# Patient Record
Sex: FEMALE | Race: OTHER | Employment: UNEMPLOYED | URBAN - METROPOLITAN AREA
[De-identification: names, ages, dates, MRNs, and addresses within clinical notes are randomized per-mention and may not be internally consistent; named-entity substitution may affect disease eponyms.]

---

## 2021-04-08 ENCOUNTER — OFFICE VISIT (OUTPATIENT)
Dept: FAMILY MEDICINE CLINIC | Facility: CLINIC | Age: 31
End: 2021-04-08
Payer: MEDICAID

## 2021-04-08 ENCOUNTER — NURSE TRIAGE (OUTPATIENT)
Dept: FAMILY MEDICINE CLINIC | Facility: CLINIC | Age: 31
End: 2021-04-08

## 2021-04-08 VITALS
HEIGHT: 65 IN | BODY MASS INDEX: 24.83 KG/M2 | SYSTOLIC BLOOD PRESSURE: 108 MMHG | HEART RATE: 81 BPM | WEIGHT: 149 LBS | DIASTOLIC BLOOD PRESSURE: 73 MMHG

## 2021-04-08 DIAGNOSIS — R11.0 NAUSEA: ICD-10-CM

## 2021-04-08 DIAGNOSIS — R42 VERTIGO: Primary | ICD-10-CM

## 2021-04-08 DIAGNOSIS — B35.1 TOENAIL FUNGUS: ICD-10-CM

## 2021-04-08 PROCEDURE — 3008F BODY MASS INDEX DOCD: CPT | Performed by: NURSE PRACTITIONER

## 2021-04-08 PROCEDURE — 81025 URINE PREGNANCY TEST: CPT | Performed by: NURSE PRACTITIONER

## 2021-04-08 PROCEDURE — 99203 OFFICE O/P NEW LOW 30 MIN: CPT | Performed by: NURSE PRACTITIONER

## 2021-04-08 PROCEDURE — 3074F SYST BP LT 130 MM HG: CPT | Performed by: NURSE PRACTITIONER

## 2021-04-08 PROCEDURE — 3078F DIAST BP <80 MM HG: CPT | Performed by: NURSE PRACTITIONER

## 2021-04-08 RX ORDER — MECLIZINE HCL 12.5 MG/1
12.5 TABLET ORAL 3 TIMES DAILY PRN
Qty: 30 TABLET | Refills: 0 | Status: SHIPPED | OUTPATIENT
Start: 2021-04-08 | End: 2021-04-27

## 2021-04-08 NOTE — PATIENT INSTRUCTIONS
Infección fúngica en la uña  Leah infección fúngica en la uña cambia el aspecto de las uñas de las tony y de los pies. Pueden engrosarse, decolorarse, cambiar de forma y dividirse.  Esta afección es difícil de tratar porque las uñas crecen lentamente y ti absorbentes y calzado que les permita a los pies respirar. Los pies sudorosos aumentan el riesgo de sufrir infección Ecilda Paullier. También hacen que sea más difícil tratar lizzette infección existente.   · Use calzado cuando esté en lugares públicos húmeros crystal pisc equilibrio. Estos medicamentos suelen venir en forma de píldoras. · [place] náuseas. Pueden usarse supositorios, píldoras o inyecciones para reducir el vómito. · Reducen la presión en los conductos.  Para tratar la enfermedad de [place] pueden usarse

## 2021-04-08 NOTE — PROGRESS NOTES
HPI    Patient presents for headache, dizziness and nausea x 1 month. With a history of vertigo. Review of Systems   Gastrointestinal: Positive for nausea. Neurological: Positive for dizziness and headaches.    All other systems reviewed and are neg Frequency of Communication with Friends and Family:       Frequency of Social Gatherings with Friends and Family:       Attends [employer] Services:       Active Member of Clubs or Organizations:       Attends Club or Organization Meetings:       [address] if not already registered.

## 2021-04-26 ENCOUNTER — OFFICE VISIT (OUTPATIENT)
Dept: PODIATRY CLINIC | Facility: CLINIC | Age: 31
End: 2021-04-26
Payer: MEDICAID

## 2021-04-26 DIAGNOSIS — B35.1 ONYCHOMYCOSIS: Primary | ICD-10-CM

## 2021-04-26 PROCEDURE — 99202 OFFICE O/P NEW SF 15 MIN: CPT | Performed by: PODIATRIST

## 2021-04-26 NOTE — PROGRESS NOTES
Robert Ambrose:    Patient ID: Sonia Fam is a 27year old female. This pleasant 49-year-old female presents as a new patient to me and states that she is self-referred. She was accompanied by her  translation.   This nail that she is concerned about

## 2021-04-27 ENCOUNTER — OFFICE VISIT (OUTPATIENT)
Dept: OBGYN CLINIC | Facility: CLINIC | Age: 31
End: 2021-04-27
Payer: MEDICAID

## 2021-04-27 VITALS
BODY MASS INDEX: 24 KG/M2 | SYSTOLIC BLOOD PRESSURE: 114 MMHG | WEIGHT: 147 LBS | DIASTOLIC BLOOD PRESSURE: 73 MMHG | HEART RATE: 79 BPM

## 2021-04-27 DIAGNOSIS — B96.89 BV (BACTERIAL VAGINOSIS): ICD-10-CM

## 2021-04-27 DIAGNOSIS — Z01.419 WOMEN'S ANNUAL ROUTINE GYNECOLOGICAL EXAMINATION: Primary | ICD-10-CM

## 2021-04-27 DIAGNOSIS — N76.0 BV (BACTERIAL VAGINOSIS): ICD-10-CM

## 2021-04-27 PROCEDURE — 3078F DIAST BP <80 MM HG: CPT | Performed by: OBSTETRICS & GYNECOLOGY

## 2021-04-27 PROCEDURE — 3074F SYST BP LT 130 MM HG: CPT | Performed by: OBSTETRICS & GYNECOLOGY

## 2021-04-27 PROCEDURE — 99385 PREV VISIT NEW AGE 18-39: CPT | Performed by: OBSTETRICS & GYNECOLOGY

## 2021-04-27 RX ORDER — METRONIDAZOLE 500 MG/1
500 TABLET ORAL 2 TIMES DAILY
Qty: 14 TABLET | Refills: 1 | Status: SHIPPED | OUTPATIENT
Start: 2021-04-27 | End: 2021-05-04

## 2021-04-27 NOTE — PROGRESS NOTES
Annual Gyn Exam    HPI  Jaida Li is a new patient to my practice and is here for an annual gynecologic evaluation.     OB History    Para Term  AB Living   1 1 1     1   SAB TAB Ectopic Multiple Live Births           1      # Outcome Date GA Lbr L Negative for palpitations. Gastrointestinal: Positive for constipation. Negative for abdominal pain, blood in stool, diarrhea, nausea and vomiting. Has had constipation issues for a long time with BM every 3 days.   Takes OTC fiber products at time Mental Status: She is alert and oriented to person, place, and time. Psychiatric:         Behavior: Behavior normal. Behavior is cooperative.        /73   Pulse 79   Wt 147 lb (66.7 kg)   LMP 04/17/2021 (Exact Date)   BMI 24.46 kg/m²     Assess

## 2021-05-12 ENCOUNTER — TELEPHONE (OUTPATIENT)
Dept: PODIATRY CLINIC | Facility: CLINIC | Age: 31
End: 2021-05-12

## 2021-05-12 NOTE — TELEPHONE ENCOUNTER
Jason Norman DPM  [facility] Clinical Staff  Please call this patient and notify them of the need for an office visit to the review fungus culture results thank you     Called pt using Language line [place] # [phone] -

## 2021-05-17 ENCOUNTER — OFFICE VISIT (OUTPATIENT)
Dept: PODIATRY CLINIC | Facility: CLINIC | Age: 31
End: 2021-05-17
Payer: MEDICAID

## 2021-05-17 ENCOUNTER — LAB ENCOUNTER (OUTPATIENT)
Dept: LAB | Age: 31
End: 2021-05-17
Attending: PODIATRIST
Payer: MEDICAID

## 2021-05-17 DIAGNOSIS — B35.1 ONYCHOMYCOSIS: Primary | ICD-10-CM

## 2021-05-17 DIAGNOSIS — B35.1 ONYCHOMYCOSIS: ICD-10-CM

## 2021-05-17 PROCEDURE — 36415 COLL VENOUS BLD VENIPUNCTURE: CPT

## 2021-05-17 PROCEDURE — 99213 OFFICE O/P EST LOW 20 MIN: CPT | Performed by: PODIATRIST

## 2021-05-17 PROCEDURE — 80076 HEPATIC FUNCTION PANEL: CPT

## 2021-05-17 NOTE — PROGRESS NOTES
HPI:    Patient ID: Delmi Oshea is a 27year old female. This is a pleasant 57-year-old female presents for review of culture results and discussion of appropriate treatment for fungus toenails.   She is accompanied today by her  who madeline

## 2021-05-19 ENCOUNTER — TELEPHONE (OUTPATIENT)
Dept: PODIATRY CLINIC | Facility: CLINIC | Age: 31
End: 2021-05-19

## 2021-05-19 RX ORDER — TERBINAFINE HYDROCHLORIDE 250 MG/1
250 TABLET ORAL DAILY
Qty: 90 TABLET | Refills: 0 | Status: SHIPPED | OUTPATIENT
Start: 2021-05-19

## 2021-05-19 NOTE — TELEPHONE ENCOUNTER
Called lang line and s/w [facility] EI#[id number] for Swiss translation- he called pt and this pt already saw Dr. Charlee Chavez on 5/17/21 and went over results and had the hepatic function panel . per ashleigh results notes-  CINTHYA Joya - [facility]   5/18/2021  8:12

## 2021-05-19 NOTE — TELEPHONE ENCOUNTER
----- Message from Radha Lorenzo DPM sent at 5/14/2021  1:53 PM CDT -----  Please call this patient and notify them of the need for an office visit to the review fungus culture results thank you

## 2021-06-10 ENCOUNTER — OFFICE VISIT (OUTPATIENT)
Dept: FAMILY MEDICINE CLINIC | Facility: CLINIC | Age: 31
End: 2021-06-10
Payer: MEDICAID

## 2021-06-10 ENCOUNTER — LAB ENCOUNTER (OUTPATIENT)
Dept: LAB | Age: 31
End: 2021-06-10
Attending: FAMILY MEDICINE
Payer: MEDICAID

## 2021-06-10 VITALS
SYSTOLIC BLOOD PRESSURE: 100 MMHG | DIASTOLIC BLOOD PRESSURE: 64 MMHG | HEIGHT: 65 IN | HEART RATE: 80 BPM | WEIGHT: 150 LBS | BODY MASS INDEX: 24.99 KG/M2

## 2021-06-10 DIAGNOSIS — R12 HEARTBURN: Primary | ICD-10-CM

## 2021-06-10 DIAGNOSIS — R10.2 PELVIC PAIN: ICD-10-CM

## 2021-06-10 DIAGNOSIS — R12 HEARTBURN: ICD-10-CM

## 2021-06-10 PROCEDURE — 3074F SYST BP LT 130 MM HG: CPT | Performed by: FAMILY MEDICINE

## 2021-06-10 PROCEDURE — 3078F DIAST BP <80 MM HG: CPT | Performed by: FAMILY MEDICINE

## 2021-06-10 PROCEDURE — 3008F BODY MASS INDEX DOCD: CPT | Performed by: FAMILY MEDICINE

## 2021-06-10 PROCEDURE — 99203 OFFICE O/P NEW LOW 30 MIN: CPT | Performed by: FAMILY MEDICINE

## 2021-06-10 PROCEDURE — 83013 H PYLORI (C-13) BREATH: CPT

## 2021-06-10 RX ORDER — FAMOTIDINE 40 MG/1
40 TABLET, FILM COATED ORAL DAILY
Qty: 60 TABLET | Refills: 0 | Status: SHIPPED | OUTPATIENT
Start: 2021-06-10 | End: 2021-08-16

## 2021-06-10 NOTE — PROGRESS NOTES
6/10/2021  1:47 PM    [address] is a 27year old female.     Chief complaint(s): Patient presents with:  Abdominal Pain: abdominal pain x 6 months that comes and goes, period was a few days late    HPI:     [address] primary complain Known Allergies      ROS:   Review of Systems   Constitutional: Negative for appetite change and fever. Eyes: Negative for visual disturbance. Respiratory: Negative for shortness of breath. Cardiovascular: Negative for chest pain.    Gastrointestinal US PELVIS (TRANSABDOMINAL PELVIS)  ([id number])       RECOMMENDATIONS given include: Patient was reassured of  her medical condition and all questions and concerns were answered.  Patient was informed to please, call our office with any new or further questi

## 2021-07-08 ENCOUNTER — HOSPITAL ENCOUNTER (OUTPATIENT)
Dept: ULTRASOUND IMAGING | Facility: HOSPITAL | Age: 31
Discharge: HOME | End: 2021-07-08
Attending: FAMILY MEDICINE
Payer: MEDICAID

## 2021-07-08 DIAGNOSIS — R10.2 PELVIC PAIN: ICD-10-CM

## 2021-07-08 PROCEDURE — 76856 US EXAM PELVIC COMPLETE: CPT | Performed by: FAMILY MEDICINE

## 2021-07-08 PROCEDURE — 76830 TRANSVAGINAL US NON-OB: CPT | Performed by: FAMILY MEDICINE

## 2021-07-17 ENCOUNTER — OFFICE VISIT (OUTPATIENT)
Dept: FAMILY MEDICINE CLINIC | Facility: CLINIC | Age: 31
End: 2021-07-17
Payer: MEDICAID

## 2021-07-17 VITALS
HEART RATE: 82 BPM | SYSTOLIC BLOOD PRESSURE: 102 MMHG | BODY MASS INDEX: 25.16 KG/M2 | DIASTOLIC BLOOD PRESSURE: 71 MMHG | HEIGHT: 65 IN | WEIGHT: 151 LBS

## 2021-07-17 DIAGNOSIS — R12 HEARTBURN: Primary | ICD-10-CM

## 2021-07-17 DIAGNOSIS — L70.0 ACNE VULGARIS: ICD-10-CM

## 2021-07-17 DIAGNOSIS — R10.2 PELVIC PAIN: ICD-10-CM

## 2021-07-17 PROCEDURE — 3074F SYST BP LT 130 MM HG: CPT | Performed by: FAMILY MEDICINE

## 2021-07-17 PROCEDURE — 99213 OFFICE O/P EST LOW 20 MIN: CPT | Performed by: FAMILY MEDICINE

## 2021-07-17 PROCEDURE — 3008F BODY MASS INDEX DOCD: CPT | Performed by: FAMILY MEDICINE

## 2021-07-17 PROCEDURE — 3078F DIAST BP <80 MM HG: CPT | Performed by: FAMILY MEDICINE

## 2021-07-17 RX ORDER — CLINDAMYCIN AND BENZOYL PEROXIDE 10; 50 MG/G; MG/G
1 GEL TOPICAL NIGHTLY
Qty: 50 G | Refills: 0 | Status: SHIPPED | OUTPATIENT
Start: 2021-07-17 | End: 2022-07-12

## 2021-07-17 NOTE — PROGRESS NOTES
7/17/2021  12:15 PM    [address]le [address] is a 27year old female. Chief complaint(s): Patient presents with:  Lab Results: Pelvic US results. HPI:     [address] primary complaint is regarding multiple complaints.      Patient 30 year status: Never Smoker      Smokeless tobacco: Never Used    Alcohol use: No      Alcohol/week: 0.0 standard drinks    Drug use: Never       Immunizations: There is no immunization history on file for this patient.     Medications (Active prior to today's Behavior: Behavior is cooperative.          LABORATORY RESULTS:   No results found for: [employer]   Results for orders placed or performed in visit on 12/74/26   HELICOBACTER PYLORI BREATH TEST, ADULT (>17)   R Schedule follow-up appointments in  prn.      2. Pelvic pain  Normal ultrasound patient reassured     3.  Acne vulgaris    MEDICATIONS:     Requested Prescriptions     Signed Prescriptions Disp Refills   • Clindamycin Phos-Benzoyl Perox 1-5 % External Gel 5

## 2021-08-16 RX ORDER — FAMOTIDINE 40 MG/1
TABLET, FILM COATED ORAL
Qty: 60 TABLET | Refills: 0 | Status: SHIPPED | OUTPATIENT
Start: 2021-08-16 | End: 2021-10-25

## 2021-09-11 ENCOUNTER — OFFICE VISIT (OUTPATIENT)
Dept: FAMILY MEDICINE CLINIC | Facility: CLINIC | Age: 31
End: 2021-09-11
Payer: MEDICAID

## 2021-09-11 VITALS
HEART RATE: 73 BPM | TEMPERATURE: 99 F | SYSTOLIC BLOOD PRESSURE: 94 MMHG | WEIGHT: 149 LBS | BODY MASS INDEX: 24.83 KG/M2 | HEIGHT: 65 IN | DIASTOLIC BLOOD PRESSURE: 61 MMHG

## 2021-09-11 DIAGNOSIS — N92.6 MISSED PERIOD: Primary | ICD-10-CM

## 2021-09-11 DIAGNOSIS — B35.1 ONYCHOMYCOSIS: ICD-10-CM

## 2021-09-11 LAB
CONTROL LINE PRESENT WITH A CLEAR BACKGROUND (YES/NO): YES YES/NO
PREGNANCY TEST, URINE: NEGATIVE

## 2021-09-11 PROCEDURE — 3074F SYST BP LT 130 MM HG: CPT | Performed by: NURSE PRACTITIONER

## 2021-09-11 PROCEDURE — 81025 URINE PREGNANCY TEST: CPT | Performed by: NURSE PRACTITIONER

## 2021-09-11 PROCEDURE — 3078F DIAST BP <80 MM HG: CPT | Performed by: NURSE PRACTITIONER

## 2021-09-11 PROCEDURE — 3008F BODY MASS INDEX DOCD: CPT | Performed by: NURSE PRACTITIONER

## 2021-09-11 PROCEDURE — 99214 OFFICE O/P EST MOD 30 MIN: CPT | Performed by: NURSE PRACTITIONER

## 2021-09-11 NOTE — PROGRESS NOTES
HPI    Patient presents for follow up of toenail fungus. Started terbinafine in may after seeing Dr Charlee Chavez. Never followed up per instruction due to location. Would like a referral to see a provider in [address] [place] [address]. [facility] 8/6/21, would like a ucg.       Re Expenses: Not on file  Food Insecurity:       Worried About [employer] in the Last Year: Not on file      Ran Out of Food in the Last Year: Not on file  Transportation Needs:       Lack of Transportation (Medical):  Not on file      Lack of Transpo Normal rate and regular rhythm. Heart sounds: Normal heart sounds. No murmur heard. Pulmonary:      Effort: Pulmonary effort is normal. No respiratory distress. Breath sounds: Normal breath sounds. No stridor. No wheezing, rhonchi or rales.

## 2021-09-24 ENCOUNTER — OFFICE VISIT (OUTPATIENT)
Dept: PODIATRY CLINIC | Facility: CLINIC | Age: 31
End: 2021-09-24
Payer: MEDICAID

## 2021-09-24 DIAGNOSIS — B35.1 ONYCHOMYCOSIS: Primary | ICD-10-CM

## 2021-09-24 PROCEDURE — 99213 OFFICE O/P EST LOW 20 MIN: CPT | Performed by: PODIATRIST

## 2021-09-24 RX ORDER — KETOCONAZOLE 20 MG/G
CREAM TOPICAL
Qty: 30 G | Refills: 2 | Status: SHIPPED | OUTPATIENT
Start: 2021-09-24

## 2021-09-24 NOTE — PROGRESS NOTES
[facility] Podiatry  Progress Note    Shayan [place] is a 27year old female. Patient presents with:  Toenail Fungus: Patient last seen by Dr. Mai Lyn on 5/17/21. Treated with Lamisil for 3 months.  Per patient has improved and would like to disc fever, chills  No calf pain  No other muscle or joint aches  Denies chest pain or shortness of breath. EXAM:   LMP 08/06/2021 (Approximate)     Constitutional:   Patient in no apparent distress. Well kept Of normal body habitus.  Alert and oriented to some time even after she has finished. Prescribed ketoconazole cream, pt to apply to right hallux nail daily for 3 months    RTC 3 months, if no improvement will consider another round of oral lamisil.   Will also discuss possible nail avulsion instead

## 2021-10-25 RX ORDER — FAMOTIDINE 40 MG/1
TABLET, FILM COATED ORAL
Qty: 60 TABLET | Refills: 0 | Status: SHIPPED | OUTPATIENT
Start: 2021-10-25

## 2022-04-05 ENCOUNTER — OFFICE VISIT (OUTPATIENT)
Dept: FAMILY MEDICINE CLINIC | Facility: CLINIC | Age: 32
End: 2022-04-05
Payer: MEDICAID

## 2022-04-05 VITALS
BODY MASS INDEX: 24.99 KG/M2 | HEIGHT: 65 IN | SYSTOLIC BLOOD PRESSURE: 106 MMHG | HEART RATE: 81 BPM | DIASTOLIC BLOOD PRESSURE: 72 MMHG | RESPIRATION RATE: 16 BRPM | WEIGHT: 150 LBS

## 2022-04-05 DIAGNOSIS — Z30.011 ENCOUNTER FOR INITIAL PRESCRIPTION OF CONTRACEPTIVE PILLS: Primary | ICD-10-CM

## 2022-04-05 PROCEDURE — 3078F DIAST BP <80 MM HG: CPT | Performed by: FAMILY MEDICINE

## 2022-04-05 PROCEDURE — 3008F BODY MASS INDEX DOCD: CPT | Performed by: FAMILY MEDICINE

## 2022-04-05 PROCEDURE — 99213 OFFICE O/P EST LOW 20 MIN: CPT | Performed by: FAMILY MEDICINE

## 2022-04-05 PROCEDURE — 3074F SYST BP LT 130 MM HG: CPT | Performed by: FAMILY MEDICINE

## 2022-04-05 RX ORDER — DROSPIRENONE AND ETHINYL ESTRADIOL 0.03MG-3MG
1 KIT ORAL DAILY
Qty: 28 TABLET | Refills: 5 | Status: SHIPPED | OUTPATIENT
Start: 2022-04-05 | End: 2023-03-31

## 2022-09-01 ENCOUNTER — OFFICE VISIT (OUTPATIENT)
Dept: FAMILY MEDICINE CLINIC | Facility: CLINIC | Age: 32
End: 2022-09-01
Payer: MEDICAID

## 2022-09-01 VITALS
DIASTOLIC BLOOD PRESSURE: 66 MMHG | WEIGHT: 145 LBS | HEART RATE: 76 BPM | SYSTOLIC BLOOD PRESSURE: 100 MMHG | HEIGHT: 65 IN | BODY MASS INDEX: 24.16 KG/M2

## 2022-09-01 DIAGNOSIS — E28.2 POLYCYSTIC OVARIAN DISEASE: Primary | ICD-10-CM

## 2022-09-01 DIAGNOSIS — Z30.011 ENCOUNTER FOR INITIAL PRESCRIPTION OF CONTRACEPTIVE PILLS: ICD-10-CM

## 2022-09-01 PROCEDURE — 3078F DIAST BP <80 MM HG: CPT | Performed by: FAMILY MEDICINE

## 2022-09-01 PROCEDURE — 3074F SYST BP LT 130 MM HG: CPT | Performed by: FAMILY MEDICINE

## 2022-09-01 PROCEDURE — 3008F BODY MASS INDEX DOCD: CPT | Performed by: FAMILY MEDICINE

## 2022-09-01 PROCEDURE — 99213 OFFICE O/P EST LOW 20 MIN: CPT | Performed by: FAMILY MEDICINE

## 2022-09-01 RX ORDER — DROSPIRENONE AND ETHINYL ESTRADIOL 0.03MG-3MG
1 KIT ORAL DAILY
Qty: 28 TABLET | Refills: 1 | Status: SHIPPED | OUTPATIENT
Start: 2022-09-01 | End: 2023-08-27

## 2022-09-01 RX ORDER — FAMOTIDINE 40 MG/1
40 TABLET, FILM COATED ORAL DAILY
Qty: 60 TABLET | Refills: 0 | Status: CANCELLED | OUTPATIENT
Start: 2022-09-01

## 2022-09-04 RX ORDER — FAMOTIDINE 40 MG/1
TABLET, FILM COATED ORAL
Qty: 90 TABLET | Refills: 1 | Status: SHIPPED | OUTPATIENT
Start: 2022-09-04

## 2022-09-04 NOTE — TELEPHONE ENCOUNTER
Refill passed per [address] [place] protocol.     Requested Prescriptions   Pending Prescriptions Disp Refills    FAMOTIDINE 40 MG Oral Tab [Pharmacy Med Name: FAMOTIDINE 40MG TABLETS] 60 tablet 0     Sig: TAKE 1 TABLET(40 MG) BY MOUTH DAILY        Gastrointestional Medication Protocol Passed - 9/4/2022 11:27 AM        Passed - In person appointment or virtual visit in the past 12 mos or appointment in next 3 mos       Recent Outpatient Visits              3 days ago Polycystic ovarian disease    [facility], [address], Cy Whitman MD    Office Visit    5 months ago Encounter for initial prescription of contraceptive pills    [facility], Cy Whitman MD    Office Visit    11 months ago Onychomycosis    [address], [address], [place]    Office Visit    11 months ago Missed period    [address], [facility] Louie [place], APR    Office Visit    1 year ago Heartburn    [address], Felipe Ponce MD    Office Visit     Future Appointments         Provider Department Appt Notes    In 1 week Michael [place] MD [address], [place] follow up hormones  Policy informed                     Recent Outpatient Visits              3 days ago Polycystic ovarian disease    [address], Cy Whitman MD    Office Visit    5 months ago Encounter for initial prescription of contraceptive pills    [address], Cy Whitman MD    Office Visit    11 months ago Onychomycosis    [address], [address] [facility]    Office Visit    11 months ago Missed period    [address], [address] [facility] APR    Office Visit    1 year ago Heartburn    [address], Cy Whitman MD    Office Visit            Future Appointments         Provider Department Appt Notes    In 1 saqib Catherine MD [facility], [facility], [address], [place] follow up hormones  Policy informed

## 2022-12-30 ENCOUNTER — LAB ENCOUNTER (OUTPATIENT)
Dept: LAB | Facility: HOSPITAL | Age: 32
End: 2022-12-30
Attending: NURSE PRACTITIONER
Payer: MEDICAID

## 2022-12-30 ENCOUNTER — OFFICE VISIT (OUTPATIENT)
Dept: OBGYN CLINIC | Facility: CLINIC | Age: 32
End: 2022-12-30
Payer: MEDICAID

## 2022-12-30 VITALS — BODY MASS INDEX: 25 KG/M2 | WEIGHT: 148 LBS | SYSTOLIC BLOOD PRESSURE: 104 MMHG | DIASTOLIC BLOOD PRESSURE: 62 MMHG

## 2022-12-30 DIAGNOSIS — R82.90 ABNORMAL URINALYSIS: ICD-10-CM

## 2022-12-30 DIAGNOSIS — R10.2 PELVIC PAIN: ICD-10-CM

## 2022-12-30 DIAGNOSIS — N92.6 MISSED MENSES: ICD-10-CM

## 2022-12-30 DIAGNOSIS — N89.8 VAGINAL ODOR: ICD-10-CM

## 2022-12-30 DIAGNOSIS — R30.0 DYSURIA: ICD-10-CM

## 2022-12-30 DIAGNOSIS — N92.6 MISSED MENSES: Primary | ICD-10-CM

## 2022-12-30 LAB
B-HCG SERPL-ACNC: 2 MIU/ML
BILIRUBIN: NEGATIVE
CONTROL LINE PRESENT WITH A CLEAR BACKGROUND (YES/NO): YES YES/NO
GLUCOSE (URINE DIPSTICK): NEGATIVE MG/DL
KETONES (URINE DIPSTICK): NEGATIVE MG/DL
KIT LOT #: NORMAL NUMERIC
MULTISTIX LOT#: ABNORMAL NUMERIC
NITRITE, URINE: NEGATIVE
PH, URINE: 6 (ref 4.5–8)
PREGNANCY TEST, URINE: NEGATIVE
PROTEIN (URINE DIPSTICK): NEGATIVE MG/DL
SPECIFIC GRAVITY: 1.01 (ref 1–1.03)
URINE-COLOR: YELLOW
UROBILINOGEN,SEMI-QN: 0.2 MG/DL (ref 0–1.9)

## 2022-12-30 PROCEDURE — 36415 COLL VENOUS BLD VENIPUNCTURE: CPT

## 2022-12-30 PROCEDURE — 99213 OFFICE O/P EST LOW 20 MIN: CPT | Performed by: NURSE PRACTITIONER

## 2022-12-30 PROCEDURE — 84702 CHORIONIC GONADOTROPIN TEST: CPT

## 2022-12-30 PROCEDURE — 87086 URINE CULTURE/COLONY COUNT: CPT

## 2022-12-30 PROCEDURE — 3078F DIAST BP <80 MM HG: CPT | Performed by: NURSE PRACTITIONER

## 2022-12-30 PROCEDURE — 81025 URINE PREGNANCY TEST: CPT | Performed by: NURSE PRACTITIONER

## 2022-12-30 PROCEDURE — 81002 URINALYSIS NONAUTO W/O SCOPE: CPT | Performed by: NURSE PRACTITIONER

## 2022-12-30 PROCEDURE — 3074F SYST BP LT 130 MM HG: CPT | Performed by: NURSE PRACTITIONER

## 2022-12-30 RX ORDER — NITROFURANTOIN 25; 75 MG/1; MG/1
100 CAPSULE ORAL 2 TIMES DAILY
Qty: 10 CAPSULE | Refills: 0 | Status: SHIPPED | OUTPATIENT
Start: 2022-12-30 | End: 2023-01-04

## 2023-01-01 LAB
GENITAL VAGINOSIS SCREEN: NEGATIVE
TRICHOMONAS SCREEN: NEGATIVE

## 2023-02-01 ENCOUNTER — OFFICE VISIT (OUTPATIENT)
Dept: OBGYN CLINIC | Facility: CLINIC | Age: 33
End: 2023-02-01

## 2023-02-01 VITALS
BODY MASS INDEX: 24.66 KG/M2 | SYSTOLIC BLOOD PRESSURE: 112 MMHG | DIASTOLIC BLOOD PRESSURE: 74 MMHG | HEIGHT: 65 IN | WEIGHT: 148 LBS

## 2023-02-01 DIAGNOSIS — N92.6 MISSED MENSES: Primary | ICD-10-CM

## 2023-02-01 LAB
CONTROL LINE PRESENT WITH A CLEAR BACKGROUND (YES/NO): YES YES/NO
KIT LOT #: 2034 NUMERIC
PREGNANCY TEST, URINE: POSITIVE

## 2023-02-01 PROCEDURE — 99213 OFFICE O/P EST LOW 20 MIN: CPT | Performed by: OBSTETRICS & GYNECOLOGY

## 2023-02-01 PROCEDURE — 3074F SYST BP LT 130 MM HG: CPT | Performed by: OBSTETRICS & GYNECOLOGY

## 2023-02-01 PROCEDURE — 3008F BODY MASS INDEX DOCD: CPT | Performed by: OBSTETRICS & GYNECOLOGY

## 2023-02-01 PROCEDURE — 81025 URINE PREGNANCY TEST: CPT | Performed by: OBSTETRICS & GYNECOLOGY

## 2023-02-01 PROCEDURE — 3078F DIAST BP <80 MM HG: CPT | Performed by: OBSTETRICS & GYNECOLOGY

## 2023-02-01 NOTE — PROGRESS NOTES
Subjective:   Patient ID: Ramirez Timmons is a 28year old female. Patient here for PCV. Discussed PNC and PNV. Optional and required screening reviewed. Diet and exercise discussed. Patient is sure of Dates. Menses are regular. Risk Factors:  None. Miscarriage Precautions reviewed. This is a 30 minute visit and greater than 50% of the time was spent counseling the patient and/or coordinating care. History/Other:   Review of Systems   Constitutional: Negative. HENT: Negative. Respiratory: Negative. Cardiovascular: Negative. Gastrointestinal: Negative. Genitourinary: Negative. Neurological: Negative. Hematological: Negative. Psychiatric/Behavioral: Negative. No current outpatient medications on file. Allergies:No Known Allergies    Objective:   Physical Exam  Vitals and nursing note reviewed. Constitutional:       Appearance: Normal appearance. She is normal weight. Skin:     General: Skin is warm and dry. Neurological:      General: No focal deficit present. Mental Status: She is alert and oriented to person, place, and time. Psychiatric:         Mood and Affect: Mood normal.         Behavior: Behavior normal.         Thought Content: Thought content normal.         Judgment: Judgment normal.         Assessment & Plan:   Missed menses  (primary encounter diagnosis)  All questions answered. Taking PNV. Miscarriage Precautions reviewed.    Orders Placed This Encounter      POC Urine pregnancy test [26499]      Meds This Visit:  Requested Prescriptions      No prescriptions requested or ordered in this encounter       Imaging & Referrals:  None

## (undated) NOTE — MR AVS SNAPSHOT
After Visit Summary   4/27/2021    Mesfin Rodriges    MRN: [id number]           Visit Information     Date & Time  4/27/2021  9:50 AM Provider  Kya Jaquez MD [address] [place] [address] , [address] [place] [address] Dept.  Phone  [phone]      Your Vi request a new code. [employer] Activation Code: [email or web address]  Expires: 6/12/2021  8:48 AM    4. Enter your Zip Code and Date of Birth (mm/dd/yyyy) as indicated and click Next. You will be taken to the next sign-up page. 5. Create a [employer] Username.  This w BRITTANEY online. The physician will respond and provide   a treatment plan within a few hours.  ONLINE VISIT  Primary Care Providers  Treatment for mild illness or injury that does not require immediate attention VIDEO VISITS  Average cost  $35*    e-VISTS  [employer]